# Patient Record
Sex: MALE
[De-identification: names, ages, dates, MRNs, and addresses within clinical notes are randomized per-mention and may not be internally consistent; named-entity substitution may affect disease eponyms.]

---

## 2018-07-27 ENCOUNTER — HOSPITAL ENCOUNTER (OUTPATIENT)
Dept: HOSPITAL 5 - CT | Age: 63
Discharge: HOME | End: 2018-07-27
Attending: SURGERY
Payer: COMMERCIAL

## 2018-07-27 DIAGNOSIS — I71.4: Primary | ICD-10-CM

## 2018-07-27 LAB — BUN SERPL-MCNC: 16 MG/DL (ref 9–20)

## 2018-07-27 PROCEDURE — 82565 ASSAY OF CREATININE: CPT

## 2018-07-27 PROCEDURE — 74174 CTA ABD&PLVS W/CONTRAST: CPT

## 2018-07-27 PROCEDURE — 84520 ASSAY OF UREA NITROGEN: CPT

## 2018-07-27 PROCEDURE — 36415 COLL VENOUS BLD VENIPUNCTURE: CPT

## 2018-07-27 NOTE — CAT SCAN REPORT
FINAL REPORT



EXAM:  CT ANGIO ABDOMEN PELVIS



HISTORY:  ABDOMINAL AORTIC ANEURYSM W/O RUPTURE 



TECHNIQUE:  CTA of the abdomen and pelvis was performed after the

administration of intravenous contrast. 



Reconstructions were included in the coronal and sagittal planes.



PRIORS:  None.



FINDINGS:  

Lower thorax: A thin walled cystic structure is seen along the

posterior mediastinum along the left posterior aspect of the

inferior thoracic aorta measuring 5.3 x 3.7 centimeters trans

axially. Mild bilateral dependent atelectasis is seen. Mild

emphysematous changes are seen in the lung bases. The visualized

portions of the heart are normal.



Liver: The liver is diffusely low in attenuation. No intrahepatic

biliary duct dilation. No focal hepatic lesions. 



Gallbladder/ biliary system: No cholelithiasis. The common bile

duct appears nondilated.



Spleen: No splenic lesions are seen.



Pancreas: No pancreatic lesions are seen. No pancreatic duct

dilation.



Kidneys: There is a tiny simple left renal cyst. Tiny

low-attenuation right renal lesion is too small to characterize

but likely represents a small cyst. No hydronephrosis.



Adrenal glands: No adrenal masses.



Vasculature: Fusiform infrarenal abdominal aortic aneurysm is

seen measuring 3.2 centimeters AP by 3.1 centimeters transverse

over a length of 4.8 centimeters. There is calcified and

noncalcified atherosclerotic plaque within the abdominal aorta

and branch vessels. The celiac axis, SMA, renal arteries and GENEVA

are patent. The common, internal and external iliac arteries are

patent. 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

iliac artery measuring 1 centimeter. No evidence of aortic

dissection. No evidence of aneurysm rupture.  



Lymph nodes: No enlarged lymph nodes are seen in the abdomen or

pelvis.



Bowel, mesentery, peritoneum: No bowel obstruction. No free fluid

or free air. The appendix is normal. No colonic diverticulosis.

No bowel wall thickening. 



Urinary bladder: No filling defects are seen.



Pelvis: Normal anatomy is noted. No masses.



Abdominal wall: No abdominal wall hernia or other subcutaneous

findings.



Bones: A bone island is seen in the left iliac bone. Degenerative

changes are seen in the spine.



IMPRESSION:  





1. 3.2 centimeter infrarenal abdominal aortic aneurysm. 

2. 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 

3. Simple and probable simple bilateral renal cysts. 

4. 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



5. Hepatic steatosis.